# Patient Record
Sex: FEMALE | Race: WHITE | URBAN - METROPOLITAN AREA
[De-identification: names, ages, dates, MRNs, and addresses within clinical notes are randomized per-mention and may not be internally consistent; named-entity substitution may affect disease eponyms.]

---

## 2023-04-24 ENCOUNTER — OFFICE VISIT (OUTPATIENT)
Dept: URBAN - METROPOLITAN AREA CLINIC 3 | Facility: CLINIC | Age: 48
End: 2023-04-24
Payer: COMMERCIAL

## 2023-04-24 DIAGNOSIS — R51.9 HEADACHE, UNSPECIFIED: Primary | ICD-10-CM

## 2023-04-24 DIAGNOSIS — H52.4 PRESBYOPIA: ICD-10-CM

## 2023-04-24 DIAGNOSIS — H35.9 UNSPECIFIED RETINAL DISORDER: ICD-10-CM

## 2023-04-24 DIAGNOSIS — H43.313 VITREOUS MEMBRANES AND STRANDS, BILATERAL: ICD-10-CM

## 2023-04-24 PROCEDURE — 99204 OFFICE O/P NEW MOD 45 MIN: CPT | Performed by: OPTOMETRIST

## 2023-04-24 ASSESSMENT — INTRAOCULAR PRESSURE
OD: 19
OS: 20

## 2023-04-24 NOTE — IMPRESSION/PLAN
Impression: Unspecified retinal disorder: H35.9. Plan: White without pressure OS. Reviewed the signs and symptoms of possible retinal detachment (flashes, floaters, change in peripheral vision, etc). Advised to contact us immediately for any of these or other new symptoms.